# Patient Record
Sex: MALE | Employment: STUDENT | URBAN - METROPOLITAN AREA
[De-identification: names, ages, dates, MRNs, and addresses within clinical notes are randomized per-mention and may not be internally consistent; named-entity substitution may affect disease eponyms.]

---
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
## 2024-03-27 ENCOUNTER — OFFICE VISIT (OUTPATIENT)
Dept: PEDIATRICS | Facility: CLINIC | Age: 19
End: 2024-03-27
Payer: COMMERCIAL

## 2024-03-27 VITALS
HEART RATE: 76 BPM | DIASTOLIC BLOOD PRESSURE: 70 MMHG | HEIGHT: 70 IN | WEIGHT: 169.6 LBS | BODY MASS INDEX: 24.28 KG/M2 | SYSTOLIC BLOOD PRESSURE: 110 MMHG

## 2024-03-27 DIAGNOSIS — F90.2 ADHD (ATTENTION DEFICIT HYPERACTIVITY DISORDER), COMBINED TYPE: ICD-10-CM

## 2024-03-27 PROCEDURE — 99213 OFFICE O/P EST LOW 20 MIN: CPT | Performed by: PEDIATRICS

## 2024-03-27 RX ORDER — DEXTROAMPHETAMINE SACCHARATE, AMPHETAMINE ASPARTATE, DEXTROAMPHETAMINE SULFATE AND AMPHETAMINE SULFATE 2.5; 2.5; 2.5; 2.5 MG/1; MG/1; MG/1; MG/1
10 TABLET ORAL DAILY
Qty: 30 TABLET | Refills: 0 | Status: SHIPPED | OUTPATIENT
Start: 2024-05-26 | End: 2024-06-25

## 2024-03-27 RX ORDER — LISDEXAMFETAMINE DIMESYLATE 30 MG/1
30 CAPSULE ORAL EVERY MORNING
Qty: 30 CAPSULE | Refills: 0 | Status: SHIPPED | OUTPATIENT
Start: 2024-04-26 | End: 2024-05-26

## 2024-03-27 RX ORDER — DEXTROAMPHETAMINE SACCHARATE, AMPHETAMINE ASPARTATE, DEXTROAMPHETAMINE SULFATE AND AMPHETAMINE SULFATE 2.5; 2.5; 2.5; 2.5 MG/1; MG/1; MG/1; MG/1
10 TABLET ORAL DAILY
Qty: 30 TABLET | Refills: 0 | Status: SHIPPED | OUTPATIENT
Start: 2024-03-27 | End: 2024-04-26

## 2024-03-27 RX ORDER — LISDEXAMFETAMINE DIMESYLATE 30 MG/1
30 CAPSULE ORAL EVERY MORNING
Qty: 30 CAPSULE | Refills: 0 | Status: SHIPPED | OUTPATIENT
Start: 2024-05-26 | End: 2024-06-25

## 2024-03-27 RX ORDER — DEXTROAMPHETAMINE SACCHARATE, AMPHETAMINE ASPARTATE, DEXTROAMPHETAMINE SULFATE AND AMPHETAMINE SULFATE 2.5; 2.5; 2.5; 2.5 MG/1; MG/1; MG/1; MG/1
10 TABLET ORAL DAILY
Qty: 30 TABLET | Refills: 0 | Status: SHIPPED | OUTPATIENT
Start: 2024-04-26 | End: 2024-05-26

## 2024-03-27 RX ORDER — LISDEXAMFETAMINE DIMESYLATE 30 MG/1
30 CAPSULE ORAL EVERY MORNING
Qty: 30 CAPSULE | Refills: 0 | Status: SHIPPED | OUTPATIENT
Start: 2024-03-27 | End: 2024-04-26

## 2024-03-27 NOTE — PROGRESS NOTES
"Subjective   History was provided by patient and mother.   Cuco Talley is a 18 y.o. male here for follow-up of ADHD.        HPI   Cuco Talley is here for follow-up of ADHD.  Cuco Talleyis taking Vyvanse 30 mg in the morning and Adderall 10 mg in the afternoon.  He said he is taking both on a pretty regular basis during the school week..  Side effects noted none.  Teachers are reporting he is doing very well with the medication.Cuco Talley is a freshman at [employer].  Grades are good.  Specific behavior concerns are none.  Issues at home include none.  He is living in a dorm..  Appetite is good.  Sleep concerns are no problems going to sleep or staying asleep..  Social concerns are none.  Risk behaviors include he occasionally drinks beer on the weekends, but does not get drunk.  He denies using marijuana or other drugs.  He also denies vaping smoking or being sexually active..  Patient denies chest pain or palpitations.    Objective   Visit Vitals  /70   Ht 1.784 m (5' 10.25\")   Wt 76.9 kg (169 lb 9.6 oz)   BMI 24.16 kg/m²   BSA 1.95 m²      Observation of Tracies behaviors in the exam room included alert, communicative..  General:  alert and oriented, in no acute distress   HEENT:  throat normal without erythema or exudate   Neck: no adenopathy    Lungs: clear to auscultation bilaterally   Heart: regular rate and rhythm, S1, S2 normal, no murmur   Skin:  warm and dry      Extremities:  extremities normal, warm and well-perfused      Neurological: alert, oriented x 3, no defects noted in general exam.     OARRS report was reviewed    Assessment/Plan   Encounter Diagnosis   Name Primary?    ADHD (attention deficit hyperactivity disorder), combined type    Continue with Vyvanse 30 mg in the morning.  Use the Adderall 10 mg in the afternoon.  I would recommend not taking it past 5 PM.  Your next med check is in 3 months      Duration of today's visit was 15 minutes, with greater than " 50% being counseling and care planning.    Follow-up in 3 months.

## 2024-11-20 ENCOUNTER — APPOINTMENT (OUTPATIENT)
Dept: PEDIATRICS | Facility: CLINIC | Age: 19
End: 2024-11-20
Payer: COMMERCIAL

## 2024-11-20 VITALS
WEIGHT: 170.8 LBS | DIASTOLIC BLOOD PRESSURE: 76 MMHG | HEIGHT: 70 IN | HEART RATE: 66 BPM | SYSTOLIC BLOOD PRESSURE: 120 MMHG | BODY MASS INDEX: 24.45 KG/M2

## 2024-11-20 DIAGNOSIS — F90.2 ADHD (ATTENTION DEFICIT HYPERACTIVITY DISORDER), COMBINED TYPE: Primary | ICD-10-CM

## 2024-11-20 PROCEDURE — 99213 OFFICE O/P EST LOW 20 MIN: CPT | Performed by: PEDIATRICS

## 2024-11-20 PROCEDURE — 3008F BODY MASS INDEX DOCD: CPT | Performed by: PEDIATRICS

## 2024-11-20 RX ORDER — LISDEXAMFETAMINE DIMESYLATE 30 MG/1
30 CAPSULE ORAL EVERY MORNING
Qty: 30 CAPSULE | Refills: 0 | Status: SHIPPED | OUTPATIENT
Start: 2025-01-19 | End: 2025-02-18

## 2024-11-20 RX ORDER — DEXTROAMPHETAMINE SACCHARATE, AMPHETAMINE ASPARTATE MONOHYDRATE, DEXTROAMPHETAMINE SULFATE AND AMPHETAMINE SULFATE 7.5; 7.5; 7.5; 7.5 MG/1; MG/1; MG/1; MG/1
30 CAPSULE, EXTENDED RELEASE ORAL EVERY MORNING
Qty: 30 CAPSULE | Refills: 0 | Status: SHIPPED | OUTPATIENT
Start: 2024-12-20 | End: 2024-11-20 | Stop reason: ENTERED-IN-ERROR

## 2024-11-20 RX ORDER — DEXTROAMPHETAMINE SACCHARATE, AMPHETAMINE ASPARTATE MONOHYDRATE, DEXTROAMPHETAMINE SULFATE AND AMPHETAMINE SULFATE 7.5; 7.5; 7.5; 7.5 MG/1; MG/1; MG/1; MG/1
30 CAPSULE, EXTENDED RELEASE ORAL EVERY MORNING
Qty: 30 CAPSULE | Refills: 0 | Status: SHIPPED | OUTPATIENT
Start: 2025-01-19 | End: 2024-11-20 | Stop reason: ENTERED-IN-ERROR

## 2024-11-20 RX ORDER — DEXTROAMPHETAMINE SACCHARATE, AMPHETAMINE ASPARTATE, DEXTROAMPHETAMINE SULFATE AND AMPHETAMINE SULFATE 2.5; 2.5; 2.5; 2.5 MG/1; MG/1; MG/1; MG/1
10 TABLET ORAL DAILY
Qty: 30 TABLET | Refills: 0 | Status: SHIPPED | OUTPATIENT
Start: 2024-12-20 | End: 2025-01-19

## 2024-11-20 RX ORDER — DEXTROAMPHETAMINE SACCHARATE, AMPHETAMINE ASPARTATE, DEXTROAMPHETAMINE SULFATE AND AMPHETAMINE SULFATE 2.5; 2.5; 2.5; 2.5 MG/1; MG/1; MG/1; MG/1
10 TABLET ORAL DAILY
Qty: 30 TABLET | Refills: 0 | Status: SHIPPED | OUTPATIENT
Start: 2025-01-19 | End: 2025-02-18

## 2024-11-20 RX ORDER — DEXTROAMPHETAMINE SACCHARATE, AMPHETAMINE ASPARTATE MONOHYDRATE, DEXTROAMPHETAMINE SULFATE AND AMPHETAMINE SULFATE 7.5; 7.5; 7.5; 7.5 MG/1; MG/1; MG/1; MG/1
30 CAPSULE, EXTENDED RELEASE ORAL EVERY MORNING
Qty: 30 CAPSULE | Refills: 0 | Status: SHIPPED | OUTPATIENT
Start: 2024-11-20 | End: 2024-11-20 | Stop reason: ENTERED-IN-ERROR

## 2024-11-20 RX ORDER — DEXTROAMPHETAMINE SACCHARATE, AMPHETAMINE ASPARTATE, DEXTROAMPHETAMINE SULFATE AND AMPHETAMINE SULFATE 2.5; 2.5; 2.5; 2.5 MG/1; MG/1; MG/1; MG/1
10 TABLET ORAL DAILY
Qty: 30 TABLET | Refills: 0 | Status: SHIPPED | OUTPATIENT
Start: 2024-11-20 | End: 2024-12-20

## 2024-11-20 RX ORDER — LISDEXAMFETAMINE DIMESYLATE 30 MG/1
30 CAPSULE ORAL EVERY MORNING
Qty: 30 CAPSULE | Refills: 0 | Status: SHIPPED | OUTPATIENT
Start: 2024-11-20 | End: 2024-12-20

## 2024-11-20 RX ORDER — LISDEXAMFETAMINE DIMESYLATE 30 MG/1
30 CAPSULE ORAL EVERY MORNING
Qty: 30 CAPSULE | Refills: 0 | Status: SHIPPED | OUTPATIENT
Start: 2024-12-20 | End: 2025-01-19

## 2024-11-20 NOTE — PROGRESS NOTES
"Subjective   History was provided by the patient.   Cuco Talley is a 18 y.o. male here for follow-up of ADD.        HPI   Cuco Talley is here for follow-up of ADHD.  Cuco Talleyis taking Vyvanse 30 mg in the morning.  He said he usually only takes it 2-3 times a week, when he has a lot of classes, homework or tests.  He takes the Adderall 10 mg in the afternoon if he did not take the Vyvanse in the morning and needs it.  He does not take it on the weekends or in the summertime..  Side effects noted none noted.  Teachers are reporting he is doing very well with the medication.Cuco Talley is a sophomore at [employer], majoring in [employer]..  Grades are very good.  Specific behavior concerns are none..  Issues at home include no concerns.  Appetite is good.  Sleep concerns are none.  Social concerns are none.  He is in a fraternity..  Risk behaviors include none.  He denies vaping, smoking, using marijuana.  He said he rarely drinks alcohol.  He denies sexual activity.  Patient denies chest pain or palpitations.    Objective   Visit Vitals  /76   Pulse 66   Ht 1.778 m (5' 10\")   Wt 77.5 kg (170 lb 12.8 oz)   BMI 24.51 kg/m²   BSA 1.96 m²      Observation of Cuco's behaviors in the exam room included he is alert with good eye contact..  General:  alert and oriented, in no acute distress   HEENT:  throat normal without erythema or exudate   Neck: no adenopathy    Lungs: clear to auscultation bilaterally   Heart: regular rate and rhythm, S1, S2 normal, no murmur   Skin:  warm and dry      Extremities:  extremities normal, warm and well-perfused      Neurological: alert, oriented x 3, no defects noted in general exam.     OARRS report was not reviewed    Assessment/Plan   Encounter Diagnosis   Name Primary?    ADHD (attention deficit hyperactivity disorder), combined type Yes   Continue with 30 mg of Vyvanse in the morning and 10 mg of Adderall in the afternoon as needed.  If " things are going well, we will do a med check in 3 months      Duration of today's visit was 15 minutes, with greater than 50% being counseling and care planning.    Follow-up in 3 months.

## 2025-08-14 ENCOUNTER — APPOINTMENT (OUTPATIENT)
Dept: PEDIATRICS | Facility: CLINIC | Age: 20
End: 2025-08-14
Payer: COMMERCIAL